# Patient Record
Sex: MALE | Race: WHITE | NOT HISPANIC OR LATINO | Employment: FULL TIME | URBAN - METROPOLITAN AREA
[De-identification: names, ages, dates, MRNs, and addresses within clinical notes are randomized per-mention and may not be internally consistent; named-entity substitution may affect disease eponyms.]

---

## 2018-12-28 ENCOUNTER — OFFICE VISIT (OUTPATIENT)
Dept: URGENT CARE | Facility: CLINIC | Age: 38
End: 2018-12-28
Payer: COMMERCIAL

## 2018-12-28 VITALS
BODY MASS INDEX: 26.46 KG/M2 | SYSTOLIC BLOOD PRESSURE: 132 MMHG | DIASTOLIC BLOOD PRESSURE: 78 MMHG | TEMPERATURE: 100.2 F | HEIGHT: 71 IN | WEIGHT: 189 LBS | RESPIRATION RATE: 13 BRPM | OXYGEN SATURATION: 94 % | HEART RATE: 100 BPM

## 2018-12-28 DIAGNOSIS — R50.9 FEVER, UNSPECIFIED FEVER CAUSE: ICD-10-CM

## 2018-12-28 DIAGNOSIS — J11.1 INFLUENZA-LIKE ILLNESS: ICD-10-CM

## 2018-12-28 LAB
FLUAV+FLUBV AG SPEC QL IA: NEGATIVE
INT CON NEG: NORMAL
INT CON POS: NORMAL

## 2018-12-28 PROCEDURE — 99203 OFFICE O/P NEW LOW 30 MIN: CPT | Performed by: PHYSICIAN ASSISTANT

## 2018-12-28 PROCEDURE — 87804 INFLUENZA ASSAY W/OPTIC: CPT | Performed by: PHYSICIAN ASSISTANT

## 2018-12-28 RX ORDER — AZITHROMYCIN 250 MG/1
TABLET, FILM COATED ORAL
Qty: 6 TAB | Refills: 0 | Status: SHIPPED | OUTPATIENT
Start: 2018-12-28

## 2018-12-28 ASSESSMENT — ENCOUNTER SYMPTOMS
NECK PAIN: 0
DIZZINESS: 0
EYE DISCHARGE: 0
CHILLS: 1
FEVER: 1
SORE THROAT: 0
DIARRHEA: 1
TINGLING: 0
VOMITING: 0
SHORTNESS OF BREATH: 0
SPUTUM PRODUCTION: 1
MYALGIAS: 1
EYE REDNESS: 0
HEADACHES: 0
COUGH: 1
WHEEZING: 0

## 2018-12-28 NOTE — PROGRESS NOTES
Subjective:      Louie Gambino is a 38 y.o. male who presents with Fever and Cough            Patient is a 38-year-old male who presents to urgent care with cough, congestion and fevers for the last 3 days.  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  Patient has been utilizing over-the-counter antipyretic medication with relief his symptoms.  He denies any shortness of breath, wheezing.      Fever    This is a new problem. Episode onset: 3 days ago. The problem occurs intermittently. The problem has been waxing and waning. The maximum temperature noted was 101 to 101.9 F. Associated symptoms include congestion, coughing, diarrhea and muscle aches. Pertinent negatives include no chest pain, ear pain, headaches, rash, sore throat, urinary pain, vomiting or wheezing. He has tried NSAIDs for the symptoms.   Risk factors: sick contacts    Cough   Associated symptoms include chills, a fever and myalgias. Pertinent negatives include no chest pain, ear pain, eye redness, headaches, rash, sore throat, shortness of breath or wheezing.       Review of Systems   Constitutional: Positive for chills, fever and malaise/fatigue.   HENT: Positive for congestion. Negative for ear discharge, ear pain and sore throat.    Eyes: Negative for discharge and redness.   Respiratory: Positive for cough and sputum production. Negative for shortness of breath and wheezing.    Cardiovascular: Negative for chest pain and leg swelling.   Gastrointestinal: Positive for diarrhea. Negative for vomiting.   Genitourinary: Negative for dysuria and urgency.   Musculoskeletal: Positive for myalgias. Negative for neck pain.   Skin: Negative for itching and rash.   Neurological: Negative for dizziness, tingling and headaches.   All other systems reviewed and are negative.         Objective:     /78 (BP Location: Right arm, Patient  "Position: Sitting, BP Cuff Size: Adult)   Pulse 100   Temp 37.9 °C (100.2 °F) (Temporal)   Resp 13   Ht 1.803 m (5' 11\")   Wt 85.7 kg (189 lb)   SpO2 94%   BMI 26.36 kg/m²    PMH:  has no past medical history on file.  MEDS:   Current Outpatient Prescriptions:   •  azithromycin (ZITHROMAX) 250 MG Tab, Take 2 tabs today, then 1 tab daily til completed., Disp: 6 Tab, Rfl: 0  ALLERGIES: No Known Allergies  SURGHX: No past surgical history on file.  SOCHX:    FH: Family history was reviewed, no pertinent findings to report    Physical Exam   Constitutional: He is oriented to person, place, and time. He appears well-developed and well-nourished.   HENT:   Head: Normocephalic and atraumatic.   Mouth/Throat: No oropharyngeal exudate.   Ears- Canals clear- TM- with clear fluid effusions bilaterally.   Pos. PND, with slight erythema- without tonsillar edema or exudate.   Mild discharge noted bilaterally- to nares.      Eyes: Pupils are equal, round, and reactive to light. EOM are normal.   Neck: Normal range of motion. Neck supple.   Cardiovascular: Normal rate and regular rhythm.    No murmur heard.  Pulmonary/Chest: Effort normal and breath sounds normal. No respiratory distress. He has no wheezes.   Without egophony   Musculoskeletal: Normal range of motion. He exhibits no tenderness.   Lymphadenopathy:     He has no cervical adenopathy.   Neurological: He is alert and oriented to person, place, and time.   Skin: Skin is warm. No rash noted.   Psychiatric: He has a normal mood and affect. His behavior is normal.   Vitals reviewed.           Influenza negative  Assessment/Plan:     1. Influenza-like illness    2. Fever, unspecified fever cause  - POCT Influenza A/B    Suspect false negative at this time for influenza as patient has several ill contacts with very similar symptoms-however patient is outside the window for Tamiflu however he also declines at this time.  Discussed complications of secondary bacterial " pneumonia of which contingent antibiotic was written for patient to only start based on guidelines discussed during visit today.  Work note provided documentation that he was evaluated in clinic today was also provided.  Encouraged OTC supportive meds PRN. Humidification, increase fluids, avoid night time dairy.   Patient given precautionary s/sx that mandate immediate follow up and evaluation in the ED. Advised of risks of not doing so.    DDX, Supportive care, and indications for immediate follow-up discussed with patient.    Instructed to return to clinic or nearest emergency department if we are not available for any change in condition, further concerns, or worsening of symptoms.    The patient demonstrated a good understanding and agreed with the treatment plan.

## 2018-12-28 NOTE — LETTER
December 28, 2018         Patient: Louie Gambino   YOB: 1980   Date of Visit: 12/28/2018           To Whom it May Concern:    Louie Gambino was seen in my clinic on 12/28/2018. Please excuse this patient from work due to recent illness- he will also be out tomorrow as well.       If you have any questions or concerns, please don't hesitate to call.        Sincerely,           David Byers P.A.-C.  Electronically Signed

## 2018-12-28 NOTE — LETTER
December 28, 2018         Patient: Louie Gambino   YOB: 1980   Date of Visit: 12/28/2018           To Whom it May Concern:    Louie Gambino was seen in my clinic on 12/28/2018. Patient was evaluated in clinic today for fevers and influenza like illness.     If you have any questions or concerns, please don't hesitate to call.        Sincerely,           David Byers P.A.-C.  Electronically Signed

## 2019-02-15 ENCOUNTER — NON-PROVIDER VISIT (OUTPATIENT)
Dept: URGENT CARE | Facility: CLINIC | Age: 39
End: 2019-02-15

## 2019-02-15 DIAGNOSIS — Z02.1 PRE-EMPLOYMENT DRUG SCREENING: ICD-10-CM

## 2019-02-15 LAB
AMP AMPHETAMINE: NORMAL
BAR BARBITURATES: NORMAL
BZO BENZODIAZEPINES: NORMAL
COC COCAINE: NORMAL
INT CON NEG: NORMAL
INT CON POS: NORMAL
MDMA ECSTASY: NORMAL
MET METHAMPHETAMINES: NORMAL
MTD METHADONE: NORMAL
OPI OPIATES: NORMAL
OXY OXYCODONE: NORMAL
PCP PHENCYCLIDINE: NORMAL
POC URINE DRUG SCREEN OCDRS: NEGATIVE
THC: NORMAL

## 2019-02-15 PROCEDURE — 80305 DRUG TEST PRSMV DIR OPT OBS: CPT | Performed by: NURSE PRACTITIONER
